# Patient Record
Sex: FEMALE | URBAN - METROPOLITAN AREA
[De-identification: names, ages, dates, MRNs, and addresses within clinical notes are randomized per-mention and may not be internally consistent; named-entity substitution may affect disease eponyms.]

---

## 2022-12-16 PROCEDURE — X1094 NO CHARGE VISIT: HCPCS | Performed by: OBSTETRICS & GYNECOLOGY

## 2022-12-17 ENCOUNTER — EXTERNAL LAB (OUTPATIENT)
Dept: OBGYN | Age: 32
End: 2022-12-17

## 2022-12-17 LAB — LAB RESULT: NORMAL

## 2022-12-17 PROCEDURE — 59409 OBSTETRICAL CARE: CPT | Performed by: OBSTETRICS & GYNECOLOGY

## 2024-09-03 ENCOUNTER — HOSPITAL ENCOUNTER (OUTPATIENT)
Age: 34
Discharge: HOME | End: 2024-09-03
Payer: MEDICAID

## 2024-09-03 VITALS
HEART RATE: 102 BPM | RESPIRATION RATE: 20 BRPM | SYSTOLIC BLOOD PRESSURE: 114 MMHG | WEIGHT: 133 LBS | BODY MASS INDEX: 24.48 KG/M2 | TEMPERATURE: 98 F | DIASTOLIC BLOOD PRESSURE: 73 MMHG | HEIGHT: 62 IN

## 2024-09-03 DIAGNOSIS — R05.1 ACUTE COUGH: Primary | ICD-10-CM

## 2024-09-03 DIAGNOSIS — K52.9 GASTROENTERITIS: ICD-10-CM

## 2024-09-03 LAB
B-HCG UR QL: NEGATIVE
BILIRUB UR QL STRIP: NEGATIVE
CLARITY UR: CLEAR
COLOR UR: YELLOW
GLUCOSE UR STRIP-MCNC: NEGATIVE MG/DL
LEUKOCYTE ESTERASE UR QL STRIP: NEGATIVE
NITRITE UR QL STRIP: NEGATIVE
PH UR STRIP: 5.5 [PH]
PROT UR STRIP-MCNC: NEGATIVE MG/DL
SARS-COV-2 RNA RESP QL NAA+PROBE: NOT DETECTED
SP GR UR STRIP: >=1.03
UROBILINOGEN UR STRIP-ACNC: <2 MG/DL

## 2024-09-03 PROCEDURE — 81002 URINALYSIS NONAUTO W/O SCOPE: CPT | Performed by: NURSE PRACTITIONER

## 2024-09-03 PROCEDURE — 99214 OFFICE O/P EST MOD 30 MIN: CPT | Performed by: NURSE PRACTITIONER

## 2024-09-03 PROCEDURE — S0119 ONDANSETRON 4 MG: HCPCS | Performed by: NURSE PRACTITIONER

## 2024-09-03 PROCEDURE — U0002 COVID-19 LAB TEST NON-CDC: HCPCS | Performed by: NURSE PRACTITIONER

## 2024-09-03 PROCEDURE — 81025 URINE PREGNANCY TEST: CPT | Performed by: NURSE PRACTITIONER

## 2024-09-03 RX ORDER — ONDANSETRON 4 MG/1
4 TABLET, ORALLY DISINTEGRATING ORAL EVERY 4 HOURS PRN
Qty: 10 TABLET | Refills: 0 | Status: SHIPPED | OUTPATIENT
Start: 2024-09-03 | End: 2024-09-10

## 2024-09-03 RX ORDER — ONDANSETRON 4 MG/1
4 TABLET, ORALLY DISINTEGRATING ORAL ONCE
Status: COMPLETED | OUTPATIENT
Start: 2024-09-03 | End: 2024-09-03

## 2024-09-03 NOTE — ED PROVIDER NOTES
Patient Seen in: [facility]    History     Chief Complaint   Patient presents with    Cough/URI     Stated Complaint: Cough, Congestion    HPI    Patient complains of diffuse abdominal pain that began yesterday.  Reports being exposed to her niece who has a stomach bug.  Pain described as cramping .  Pain rated as 4/10.  Modifying factors include: nothing .          History reviewed. No pertinent past medical history.    Past Surgical History:   Procedure Laterality Date    Fracture surgery              No family history on file.    Social History     Socioeconomic History    Marital status: Single   Tobacco Use    Smoking status: Every Day     Types: Cigarettes    Smokeless tobacco: Never     Social Determinants of Health     Financial Resource Strain: Not At Risk (12/16/2022)    Received from [facility]    Financial Resource Strain     How hard is it for you to pay for the very basics like food, housing, medical care, and heating?: Not hard at all   Food Insecurity: No Food Insecurity (5/2/2024)    Received from [facility]    Food Insecurity     Currently or in the past 3 months, have you worried your food would run out before you had money to buy more?: No     In the past 12 months, have you run out of food or been unable to get more?: No   Transportation Needs: No Transportation Needs (5/2/2024)    Received from [facility]    Transportation Needs     Medical Transportation Needs?: No    Received from [facility]    Social Connections    Received from [facility]    Housing Stability       Review of Systems    Positive for stated complaint: Cough, Congestion  Other systems are as noted in HPI.  Constitutional and vital signs reviewed.      All other systems reviewed and negative except as noted above.    PSFH elements reviewed from today and agreed except as otherwise stated in HPI.    Physical Exam      ED Triage Vitals   BP 09/03/24 1228 114/73   Pulse 09/03/24 1228 102   Resp 09/03/24 1228 20   Temp 09/03/24 1228 98.3 °F (36.8 °C)   Temp src 09/03/24 1228 Temporal   SpO2 --    O2 Device 09/03/24 1232 None (Room air)       Current:/73   Pulse 102   Temp 98.3 °F (36.8 °C) (Temporal)   Resp 20   Ht 157.5 cm (5' 2\")   Wt 60.3 kg   LMP 08/27/2024 (Approximate)   BMI 24.33 kg/m²   Pulse ox 98%on RA         Physical Exam  General Appearance: alert, no distress  Eyes: pupils equal and round no pallor or injection  ENT, Mouth: mucous membranes moist  Respiratory: there are no retractions, lungs are clear to auscultation  Cardiovascular: regular rate and rhythm    Gastrointestinal: soft, non tender, no mass, normal bowel sounds, no swelling, no ecchymosis, no pain at mcburney's point, negative maloney sign.  Neurological: II-XII grossly intact  no focal deficits  Skin: warm and dry, no rashes.  Musculoskeletal: neck is supple non tender        Extremities are symmetrical, full range of motion  Psychiatric: patient is pleasant, there is no agitation    DIFFERENTIAL DIAGNOSIS: After history and physical exam differential diagnosis was considered for gastroenteritis, foodborne illness, bowel obstruction.          ED Course     Labs Reviewed   RAPID SARS-COV-2 BY PCR - Normal     I have personally  reviewed available prior medical records for any recent pertinent discharge summaries/testing. Patient/family updated on results and plan, a verbalized understanding and agreement with the plan.  I explained to the patient that emergent conditions may arise and to go to the ER for new, worsening or any persistent conditions. I've explained the importance of taking all medicatons as prescribed, follow up, and return precuations,  All questions answered.    Please note that this report has been produced using speech recognition software and may contain errors related to that system including, but not limited to, errors  in grammar, punctuation, and spelling, as well as words and phrases that possibly may have been recognized inappropriately.  If there are any questions or concerns, contact the dictating provider for clarification.  MDM         Patient presents with abdominal pain, nausea and emesis Patient is afebrile, does not appear toxic and does not meet SIRS criteria.  Patient does not have tenderness over Mcburney's point, rebound tenderness or guarding. Patient does not appear clinically dehydrated and is able to tolerate po. Encouraged patient on oral hydration. UA does not reveal evidence of UTI. Pregnancy test is negative, and patient has her tubes tied. No vaginal discharge or suprapubic tenderness. Discussed with patient about possibility of missed early appendicitis and need to return to ED if they develop fever >103, worsened pain, persistent emesis or other new or worsened symptoms      Disposition and Plan     Clinical Impression:  1. Acute cough    2. Gastroenteritis        Disposition:  Discharge    Follow-up:  No follow-up provider specified.    Medications Prescribed:  Current Discharge Medication List        START taking these medications    Details   ondansetron 4 MG Oral Tablet Dispersible Take 1 tablet (4 mg total) by mouth every 4 (four) hours as needed for Nausea.  Qty: 10 tablet, Refills: 0

## 2024-09-22 ENCOUNTER — HOSPITAL ENCOUNTER (OUTPATIENT)
Age: 34
Discharge: HOME | End: 2024-09-22
Payer: MEDICAID

## 2024-09-22 VITALS
DIASTOLIC BLOOD PRESSURE: 72 MMHG | SYSTOLIC BLOOD PRESSURE: 113 MMHG | HEIGHT: 62 IN | RESPIRATION RATE: 18 BRPM | HEART RATE: 80 BPM | TEMPERATURE: 98 F | OXYGEN SATURATION: 97 % | BODY MASS INDEX: 23.92 KG/M2 | WEIGHT: 130 LBS

## 2024-09-22 DIAGNOSIS — R19.7 DIARRHEA OF PRESUMED INFECTIOUS ORIGIN: Primary | ICD-10-CM

## 2024-09-22 DIAGNOSIS — R11.2 NAUSEA AND VOMITING IN ADULT: ICD-10-CM

## 2024-09-22 RX ORDER — ONDANSETRON 4 MG/1
4 TABLET, ORALLY DISINTEGRATING ORAL EVERY 4 HOURS PRN
Qty: 10 TABLET | Refills: 0 | Status: SHIPPED | OUTPATIENT
Start: 2024-09-22 | End: 2024-09-29

## 2024-09-22 RX ORDER — SUCRALFATE 1 G/1
1 TABLET ORAL
Qty: 15 TABLET | Refills: 0 | Status: SHIPPED | OUTPATIENT
Start: 2024-09-22

## 2024-09-22 RX ORDER — ONDANSETRON 4 MG/1
4 TABLET, ORALLY DISINTEGRATING ORAL EVERY 8 HOURS PRN
COMMUNITY

## 2024-09-22 NOTE — ED INITIAL ASSESSMENT (HPI)
Patient started Friday feeling fine. [place] the [employer] train [place] the train. She had acid reflux without being able to burp right away. She since has had vomiting and diarrhea. She hasn't vomited since 1230, but diarrhea has continued. Fever spiked at midnight last night of 101. She has been taking Advil dual action. Her stool has been orange to green in color. She is concerned about food poisoning and the diarrhea has been so bad she is concerned about having accidents.

## 2024-09-22 NOTE — ED PROVIDER NOTES
Patient Seen in: [facility]      History     Chief Complaint   Patient presents with    Nausea/Vomiting/Diarrhea    Fever     Stated Complaint: vomiting and diarrhea with fever    Subjective:   HPI    34-year-old female.  2 days prior to arrival the patient was on the [employer] train.  She ate a meal on the train.  That evening, patient had developed moderate reflux type symptoms.  She later then developed nausea, vomiting diarrhea.  Patient had leftover Zofran which is offered excellent relief of the nausea and vomiting but the diarrhea has persisted.  She describes her stools being multicolored, green to orange.  No recent travel or antibiotics.  She had a single measurable fever of 101.  This has resolved and recurred.  She is not currently have any pain.    Objective:   History reviewed. No pertinent past medical history.           Past Surgical History:   Procedure Laterality Date    Fracture surgery                  Social History     Socioeconomic History    Marital status: Single   Tobacco Use    Smoking status: Every Day     Types: Cigarettes    Smokeless tobacco: Never     Social Determinants of Health     Financial Resource Strain: Not At Risk (12/16/2022)    Received from [facility]    Financial Resource Strain     How hard is it for you to pay for the very basics like food, housing, medical care, and heating?: Not hard at all   Food Insecurity: No Food Insecurity (5/2/2024)    Received from [facility]    Food Insecurity     Currently or in the past 3 months, have you worried your food would run out before you had money to buy more?: No     In the past 12 months, have you run out of food or been unable to get more?: No   Transportation Needs: No Transportation Needs (5/2/2024)    Received from [facility]    Transportation Needs     Medical Transportation Needs?: No    Received from [facility]    Social Connections     Received from [facility]    Housing Stability              Review of Systems    Positive for stated Chief Complaint: Nausea/Vomiting/Diarrhea and Fever    Other systems are as noted in HPI.  Constitutional and vital signs reviewed.      All other systems reviewed and negative except as noted above.    Physical Exam     ED Triage Vitals [09/22/24 1351]   /72   Pulse 80   Resp 18   Temp 97.8 °F (36.6 °C)   Temp src Temporal   SpO2 97 %   O2 Device None (Room air)       Current Vitals:   Vital Signs  BP: 113/72  Pulse: 80  Resp: 18  Temp: 97.8 °F (36.6 °C)  Temp src: Temporal    Oxygen Therapy  SpO2: 97 %  O2 Device: None (Room air)            Physical Exam    Gen: Well appearing, well groomed, alert and aware x 3  Neck: Supple, full range of motion, no thyromegaly or lymphadenopathy.  Abdominal: Soft exam without distention.  No pain to palpation all 4 quadrants. No organomegaly.  Normal bowel sounds.  Back: Full active range of motion.  No CVA tenderness bilaterally.  Lung: No distress, RR, no retraction, breath sounds are clear bilaterally  Cardio: Regular rate and rhythm, normal S1-S2, no murmur appreciable    ED Course   Labs Reviewed - No data to display                   MDM          This is a nontoxic-appearing female.  She is afebrile.  No tachycardia.  She currently has a benign belly exam.  She does not currently complain of any abdominal pain.  She describes green discolored stools and moderate diarrhea that initiated Friday.  Stool cultures were ordered.  Patient prefers to perform these in an outpatient setting.  She was also provided with Pepcid, Carafate and Zofran.  We discussed, at length, signs and symptoms that would necessitate further evaluation.  She will develop severe pain, fever, blood or mucus in stool she should report to the ER for CT scan      Continue Zofran.  Push clear fluids with electrolytes.  Carafate for reflux type symptoms.  Recommend over-the-counter  Pepcid or Tagamet.  Complete stool studies as soon as possible.  For any acute worsening including fever, pain or blood in stools report to the ER.                         Medical Decision Making      Disposition and Plan     Clinical Impression:  1. Diarrhea of presumed infectious origin    2. Nausea and vomiting in adult         Disposition:  Discharge  9/22/2024  2:12 pm    Follow-up:  No follow-up provider specified.        Medications Prescribed:  Current Discharge Medication List        START taking these medications    Details   sucralfate (CARAFATE) 1 g Oral Tab Take 1 tablet (1 g total) by mouth 4 (four) times daily before meals and nightly.  Qty: 15 tablet, Refills: 0

## 2024-09-22 NOTE — DISCHARGE INSTRUCTIONS
Continue Zofran.  Push clear fluids with electrolytes.  Carafate for reflux type symptoms.  Recommend over-the-counter Pepcid or Tagamet.  Complete stool studies as soon as possible.  For any acute worsening including fever, pain or blood in stools report to the ER.

## (undated) NOTE — LETTER
Date & Time: 9/22/2024, 2:24 PM  Patient: Humaira Soliz  Encounter Provider(s):    Soumya Jacob PA-C       To Whom It May Concern:    Humaira Soliz was seen and treated in our department on 9/22/2024.  She was evaluated for symptoms that started on 9/21/2024.  She should not return to work until fever free and symptomatically improved for at least 24 hours  If you have any questions or concerns, please do not hesitate to call.        _____________________________  Physician/APC Signature

## (undated) NOTE — LETTER
Date & Time: 9/3/2024, 1:39 PM  Patient: Humaira Soliz  Encounter Provider(s):    Gilda Floyd APRN       To Whom It May Concern:    Humaira Soliz was seen and treated in our department on 9/3/2024. She can return to work 09/05/2024.    If you have any questions or concerns, please do not hesitate to call.        _____________________________  Physician/APC Signature